# Patient Record
Sex: FEMALE | Race: WHITE | Employment: FULL TIME | URBAN - METROPOLITAN AREA
[De-identification: names, ages, dates, MRNs, and addresses within clinical notes are randomized per-mention and may not be internally consistent; named-entity substitution may affect disease eponyms.]

---

## 2018-08-08 ENCOUNTER — OFFICE VISIT (OUTPATIENT)
Dept: RHEUMATOLOGY | Age: 58
End: 2018-08-08

## 2018-08-08 VITALS
SYSTOLIC BLOOD PRESSURE: 148 MMHG | HEIGHT: 63 IN | DIASTOLIC BLOOD PRESSURE: 79 MMHG | TEMPERATURE: 98.5 F | WEIGHT: 182 LBS | HEART RATE: 89 BPM | BODY MASS INDEX: 32.25 KG/M2 | RESPIRATION RATE: 18 BRPM

## 2018-08-08 DIAGNOSIS — M79.672 BILATERAL FOOT PAIN: ICD-10-CM

## 2018-08-08 DIAGNOSIS — M79.671 BILATERAL FOOT PAIN: ICD-10-CM

## 2018-08-08 DIAGNOSIS — M19.042 PRIMARY OSTEOARTHRITIS OF BOTH HANDS: ICD-10-CM

## 2018-08-08 DIAGNOSIS — M19.031 LOCALIZED PRIMARY OSTEOARTHROSIS OF CARPOMETACARPAL JOINT OF RIGHT WRIST: ICD-10-CM

## 2018-08-08 DIAGNOSIS — M19.041 PRIMARY OSTEOARTHRITIS OF BOTH HANDS: ICD-10-CM

## 2018-08-08 DIAGNOSIS — M17.0 PRIMARY OSTEOARTHRITIS OF BOTH KNEES: Primary | ICD-10-CM

## 2018-08-08 RX ORDER — LORATADINE 10 MG/1
10 TABLET ORAL DAILY
COMMUNITY

## 2018-08-08 RX ORDER — OMEPRAZOLE 20 MG/1
20 CAPSULE, DELAYED RELEASE ORAL DAILY
COMMUNITY

## 2018-08-08 RX ORDER — SIMVASTATIN 20 MG/1
TABLET, FILM COATED ORAL
COMMUNITY

## 2018-08-08 RX ORDER — HYDROXYZINE HYDROCHLORIDE 10 MG/1
TABLET, FILM COATED ORAL
COMMUNITY

## 2018-08-08 RX ORDER — GLUCOSAMINE SULFATE 1500 MG
POWDER IN PACKET (EA) ORAL DAILY
COMMUNITY

## 2018-08-08 RX ORDER — BISOPROLOL FUMARATE 5 MG/1
5 TABLET ORAL DAILY
COMMUNITY

## 2018-08-08 RX ORDER — EZETIMIBE 10 MG/1
TABLET ORAL
COMMUNITY

## 2018-08-08 RX ORDER — ASPIRIN 81 MG/1
TABLET ORAL DAILY
COMMUNITY

## 2018-08-08 RX ORDER — ESCITALOPRAM OXALATE 10 MG/1
10 TABLET ORAL DAILY
COMMUNITY

## 2018-08-08 NOTE — PROGRESS NOTES
REASON FOR VISIT    This is the initial evaluation for Ms. Rogel Setting a 61 y.o.  female for question of an inflammatory arthritis. The patient is self-referred to the Arthritis and [address] [place] [address]. HISTORY OF PRESENT ILLNESS      I have reviewed and summarized old records from [facility]    In 2014, she developed pain in her thumbs, wrists, outer hips, feet. In 2/11/2014, she had a right 3rd digit trigger finger with good relief. In 5/04/2017, she developed right hand pain with mobility issues. She was given a wrist/thumb brace for 6 weeks with diclofenac 75 mg twice daily without improvement although the brace helped. In 6/15/2017, she received an injection into her right CMC thumb which helped. In 11/02/2017, she had an injection in her hand    In 1/10/2018, she had an injection in her right CMC thumb with relief. Today, she complains of progressive problems in her hands and feet described as aching pain and swelling. She has pain in her left IP pain associated with swelling. She has stiffness in her hands that does not improve. The pain is stabbing if she hits it. The pain is worse with use and improves with rest. She has stiffness and sore pain in her big toes lasting 30 minutes to hours. She had injections in the past without relief. Diclofenac, Aleve, Motrin, or Tramadol do not help. She has done physical therapy for her right thumb without relief but the brace helps her thumb. She has not done physical therapy for her outer hips. 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     Therapy History includes:    Current DMARD therapy includes: none  Prior DMARD therapy includes: none  The following DMARDs have been ineffective: none  The following DMARDs were stopped because of side effects: none    REVIEW OF SYSTEMS    A 15 point review of systems was performed and summarized below.  The questionnaire was reviewed with the patient and scanned into the patient's medical record.     General: denies recent weight gain, recent weight loss, fatigue, weakness, fever, night sweats  Musculoskeletal: endorses joint pain, joint swelling, morning stiffness (lasting 30 minutes to hours), denies muscle pain  Ears: denies ringing in ears, loss of hearing, deafness  Eyes: denies pain, redness, loss of vision, double vision, blurred vision, dryness, foreign body sensation  Mouth: denies sore tongue, oral ulcers, bleeding gums, loss of taste, dryness, increased dental caries  Nose: denies nosebleeds, loss of smell, nasal ulcers  Throat: denies frequent sore throats, hoarseness, difficulty in swallowing, pain in jaw while chewing  Neck: denies swollen glands, tender glands  Cardiopulmonary: denies pain in chest, irregular heart beat, sudden changes in heart beat, shortness of breath, difficulty breathing at night, dry cough, productive cough, coughing of blood, wheezing  Gastrointestinal: denies nausea, heartburn, stomach pain relieved by food, vomiting of blood/\"coffee grounds\", jaundice, increasing constipation, persistent diarrhea, blood in stools, black stools  Genitourinary: denies nocturia, difficult urination, pain or burning on urination, blood in urine, cloudy urine, pus in urine, genital discharge, frequent urination, vaginal dryness, rash/ulcers, sexual difficulties  Hematologic: denies anemia, bleeding tendency, blood clots  Skin: denies easy bruising, sun sensitive, rash, redness, hives, skin tightness, nodules/bumps, hair loss, color changes of hands or feet in the cold (Raynaud's)  Neurologic: denies headaches, dizziness, muscle weakness, numbness or tingling in hands/feet, memory loss  Psychiatric: denies depression, excessive worries, PTSD, Bipolar  Sleep: endorses obstructive sleep apnea, denies poor sleep (7-9 hours), denies snoring, apnea, daytime somnolence, difficulty falling asleep, difficulty staying asleep     PAST MEDICAL HISTORY    She has a past medical history of Hyperlipidemia; Hypertension; and Sleep apnea. FAMILY HISTORY    Her family history includes Arthritis-osteo in her father and mother; Atrial Fibrillation in her father and mother; Cancer in her father; Diabetes in her father; Heart Attack in her father; Heart Disease in her father; High Cholesterol in her father and mother; Hypertension in her father, mother, sister, and sister. SOCIAL HISTORY    She reports that she has never smoked. She has never used smokeless tobacco. She reports that she drinks alcohol. She reports that she does not use illicit drugs. GYNECOLOGIC HISTORY     0, Para 0, Living 0, Miscarriage 0    She denies severe pre-eclampsia, eclampsia or placental insufficiency    HEALTH MAINTENANCE    Immunizations    There is no immunization history on file for this patient. Age Appropriate Cancer Screening    Colonoscopy:   PAP Smear: 2018  Mammogram: 2018    MEDICATIONS    Current Outpatient Prescriptions   Medication Sig Dispense Refill    bisoprolol (ZEBETA) 5 mg tablet Take 5 mg by mouth daily.  ezetimibe (ZETIA) 10 mg tablet Take  by mouth.  simvastatin (ZOCOR) 20 mg tablet Take  by mouth nightly.  escitalopram oxalate (LEXAPRO) 10 mg tablet Take 10 mg by mouth daily.  hydrOXYzine HCl (ATARAX) 10 mg tablet Take  by mouth three (3) times daily as needed for Itching.  loratadine (CLARITIN) 10 mg tablet Take 10 mg by mouth daily.  omeprazole (PRILOSEC) 20 mg capsule Take 20 mg by mouth daily.  aspirin delayed-release 81 mg tablet Take  by mouth daily.  cholecalciferol (VITAMIN D3) 1,000 unit cap Take  by mouth daily. ALLERGIES    No Known Allergies    PHYSICAL EXAMINATION    Visit Vitals    /79    Pulse 89    Temp 98.5 °F (36.9 °C)    Resp 18    Ht 5' 3\" (1.6 m)    Wt 182 lb (82.6 kg)    BMI 32.24 kg/m2     Body mass index is 32.24 kg/(m^2).     General: Patient is alert, oriented x 3, not in acute distress    HEENT:   Conjunctiva are not injected and appear moist, there is no alopecia, neck is supple  Salivary glands are normal    Cardiovascular:  Heart is regular rate and rhythm, no murmurs. Chest:  Lungs are clear to auscultation bilaterally. Extremities:  Free of clubbing, cyanosis, edema, extremities well perfused. Neurological exam:  No focal sensory deficits, muscle strength is full in upper and lower extremities. Skin exam:  There are no rashes, no tophi, no psoriasis, no active Raynaud's, no livedo reticularis, no periungual erythema. Musculoskeletal exam:  A comprehensive musculoskeletal exam was performed for all joints of each upper and lower extremity and assessed for swelling, tenderness and range of motion. Pertinent results are documented as below:    Right CMC tenderness  Right 3rd A1 pulley crepitus with tenderness with bony hypertrophy of the right 3rd MCP  Bilateral Jeff and [place] nodes. Bilateral knee crepitus without effusion. Bilateral 1st MTP tenderness    Z-Deformities:   no  [place] Neck Deformities:  no  Boutonierre's Deformities:  no  Ulnar Deviation:   no  MCP Subluxation:  no    Joint Count 8/8/2018   Patient pain (0-100) 75   MHAQ 0.125   Left thumb IP - Tender 1   Left thumb IP - Swollen 1   Left 4th PIP - Tender 1   Right 2nd PIP - Tender 1   Right 2nd PIP - Swollen 1   Tender Joint Count (Total) 3   Swollen Joint Count (Total) 2   Patient Assessment (0-10) 2     DATA REVIEW    Prior medical records were reviewed and are summarized as below:    Laboratory data: summarized in the HPI    Imaging: summarized in the HPI. ASSESSMENT AND PLAN    1) Bilateral Hand and CMC Osteoarthritis. The patient has osteoarthritis, which is also known as \"wear and tear arthritis,\" non-inflammatory arthritis or mechanical arthritis. There are hereditary, vocational and posttraumatic joint injuries predisposing factors.  I recommend non-pharmacologic modalities such as keeping hands warm, avoid activities that case pain, warm water soaks, in addition to (2) pharmacologic modalities such as acetaminophen as first line, oral and topical NSAIDs as second line. Naproxen is a low MIRANDA-2 selectivity inhibitor that poses lower cardiovascular risk than other NSAIDs (Tulio Monson. Clinical Pharmacology and Cardiovascular Safety of Naproxen. Am J Cardiovasc Drugs. 2016 Nov 8). NSAIDs should not be used in patients on blood thinners, chronic kidney disease, high risk coronary artery disease, and inflammatory bowel disease (ulcerative colitis or Crohn's disease) I recommended ball squeezing and holding until hand fatigue then alternating to other hand exercises 10 times twice daily. I ordered screening labs and radiographs for Rheumatoid Arthritis but I have a low index of suspiciouns based on her history and exam. If abnormal, I will have her follow up.    2) Bilateral Knee Osteoarthritis. The patient has osteoarthritis, which is also known as \"wear and tear arthritis,\" non-inflammatory arthritis or mechanical arthritis. There are hereditary, vocational and posttraumatic joint injuries predisposing factors. I recommend maintaining a healthy weight to slow the progression of osteoarthritis in addition to following the [employer] of Rheumatology Osteoarthritis Treatment Guidelines: (1) non-pharmacologic modalities such as aerobic, aquatic, and/or resistance exercises as well as weight loss for overweight patients; in addition to (2) pharmacologic modalities such as acetaminophen as first line, oral and topical NSAIDs as second line, tramadol as third line and intra-articular corticosteroid injections as fourth line (Tierney MC, et al. Arthritis Care Res [facility] ORTHOPEDIC). 2012;64(4):465). Naproxen is a low MIRANDA-2 selectivity inhibitor that poses lower cardiovascular risk than other NSAIDs (Tulio Monson. Clinical Pharmacology and Cardiovascular Safety of Naproxen.  Am J Cardiovasc Drugs. 2016 Nov 8). NSAIDs should not be used in patients on blood thinners, chronic kidney disease, high risk coronary artery disease, and inflammatory bowel disease (ulcerative colitis or Crohn's disease) Joint replacement surgery if all previous fail, knowing that there is a 10 year lifespan per prosthesis. I recommend weight loss because every 1 lb of extra weight is approximately 5 lbs of extra weight on the knees. I asked the patient to count their daily caloric intake and try not to exceed 7548-7843 calories. I asked the patient to perform at least 30 minutes of physical exercise per day, such as walking, climbing stairs, or swimming. If they have access to a pool, I recommend walking in the pool for at least 30 minutes every day. Performing at least 8 weeks of yoga (two 60-minute classes and 1 home practice per week) was shown to help individuals with arthritis safely increase physical activity, and improve physical and psychological health [facility] et al. Jace Hyatt Rheumatol. 2015 Jul;42(7):1194-202). She follows with orthopedic with injections. 3) Bilateral 1st MTP Pain. This appears to be degenerative versus inflammation and not crystal. It improves with cortisone injections. I ordered radiographs. The patient voiced understanding of the aforementioned assessment and plan. Summary of plan was provided in the After Visit Summary patient instructions. I also provided education about MyChart setup and utility.     TODAY'S ORDERS    Orders Placed This Encounter    XR FOOT LT MIN 3 V    XR FOOT RT MIN 3 V    XR HAND LT MIN 3 V    XR HAND RT MIN 3 V    CYCLIC CITRUL PEPTIDE AB, IGG    RHEUMATOID FACTOR, QL     Future Appointments  Date Time Provider Leelee Springer   8/8/2018 12:00 PM PAFP XRAY PAFP NORM SCHED     Alva Bar MD, [address] [facility]    Adult Rheumatology   Musculoskeletal Ultrasound Certified  [facility]  [address] [place] [address]   Phone [phone]  Fax [phone]

## 2018-08-08 NOTE — MR AVS SNAPSHOT
[address] [place] [address] 
[phone] Patient: Adelita Mcintyre MRN: [id number] :1960 Visit Information Date & Time Provider Department Dept. Phone Encounter #  
 2018 11:00 AM Nitin Samules, [address] [facility] [id number] Upcoming Health Maintenance Date Due Hepatitis C Screening 1960 DTaP/Tdap/Td series (1 - Tdap) 1980 PAP AKA CERVICAL CYTOLOGY 1980 FOBT Q 1 YEAR AGE 50-75 2009 BREAST CANCER SCRN MAMMOGRAM 2016 Influenza Age 5 to Adult 2018 Allergies as of 2018  Never Reviewed Not on File Current Immunizations  Never Reviewed No immunizations on file. Not reviewed this visit You Were Diagnosed With   
  
 Codes Comments Primary osteoarthritis of both knees    -  Primary ICD-10-CM: M17.0 ICD-9-CM: 715.16 Primary osteoarthritis of both hands     ICD-10-CM: M19.041, S36.979 ICD-9-CM: 715.14 Vitals BP Pulse Temp Resp Height(growth percentile) Weight(growth percentile) 148/79 89 98.5 °F (36.9 °C) 18 5' 3\" (1.6 m) 182 lb (82.6 kg) BMI OB Status Smoking Status 32.24 kg/m2 Hysterectomy Never Smoker BMI and BSA Data Body Mass Index Body Surface Area  
 32.24 kg/m 2 1.92 m 2 Preferred Pharmacy Pharmacy Name Phone [id number] [place], [address] [place]oaib Gao [phone] Your Updated Medication List  
  
   
This list is accurate as of 18 11:42 AM.  Always use your most recent med list.  
  
  
  
  
 aspirin delayed-release 81 mg tablet Take  by mouth daily. bisoprolol 5 mg tablet Commonly known as:  Raymondo Kales Take 5 mg by mouth daily. CLARITIN 10 mg tablet Generic drug:  loratadine Take 10 mg by mouth daily. hydrOXYzine HCl 10 mg tablet Commonly known as:  ATARAX Take  by mouth three (3) times daily as needed for Itching. LEXAPRO 10 mg tablet Generic drug:  escitalopram oxalate Take 10 mg by mouth daily. omeprazole 20 mg capsule Commonly known as:  PRILOSEC Take 20 mg by mouth daily. simvastatin 20 mg tablet Commonly known as:  ZOCOR Take  by mouth nightly. VITAMIN D3 1,000 unit Cap Generic drug:  cholecalciferol Take  by mouth daily. ZETIA 10 mg tablet Generic drug:  ezetimibe Take  by mouth. We Performed the Following [address], IGG [id number] CPT(R)] RHEUMATOID FACTOR, QL [id number] CPT(R)] To-Do List   
 08/08/2018 Imaging:  XR FOOT LT MIN 3 V   
  
 08/08/2018 Imaging:  XR FOOT RT MIN 3 V   
  
 08/08/2018 Imaging:  XR HAND LT MIN 3 V   
  
 08/08/2018 Imaging:  XR HAND RT MIN 3 V [facility]! [facility] introduces [employer] patient portal. Now you can access parts of your medical record, email your doctor's office, and request medication refills online. 1. In your internet browser, go to [email or web address] 2. Click on the First Time User? Click Here link in the Sign In box. You will see the New Member Sign Up page. 3. Enter your [employer] Access Code exactly as it appears below. You will not need to use this code after youve completed the sign-up process. If you do not sign up before the expiration date, you must request a new code. · Image[employer] Access Code: [email or web address] Expires: 11/6/2018 11:42 AM 
 
4. Enter the last four digits of your Social Security Number (xxxx) and Date of Birth (mm/dd/yyyy) as indicated and click Submit. You will be taken to the next sign-up page. 5. Create a [employer] ID. This will be your [employer] login ID and cannot be changed, so think of one that is secure and easy to remember. 6. Create a [employer] password. You can change your password at any time. 7. Enter your Password Reset Question and Answer. This can be used at a later time if you forget your password. 8. Enter your e-mail address. You will receive e-mail notification when new information is available in [address] 9. Click Sign Up. You can now view and download portions of your medical record. 10. Click the Download Summary menu link to download a portable copy of your medical information. If you have questions, please visit the Frequently Asked Questions section of the [employer] website. Remember, [employer] is NOT to be used for urgent needs. For medical emergencies, dial 911. Now available from your iPhone and Android! Please provide this summary of care documentation to your next provider. Your primary care clinician is listed as Delray Frankel. If you have any questions after today's visit, please call [phone].

## 2018-08-09 LAB
CCP IGA+IGG SERPL IA-ACNC: 2 UNITS (ref 0–19)
RHEUMATOID FACT SERPL-ACNC: <10 IU/ML (ref 0–13.9)

## 2018-08-10 ENCOUNTER — PATIENT MESSAGE (OUTPATIENT)
Dept: RHEUMATOLOGY | Age: 58
End: 2018-08-10

## 2018-08-28 NOTE — TELEPHONE ENCOUNTER
How Severe Is Your Cyst?: mild Patient notified xray results and lab work is needed per Dr. Edgardo Spears. Pt verbalized understanding . States she is out of town and will be in for labs when she returns. Is This A New Presentation, Or A Follow-Up?: Cyst

## 2018-09-05 DIAGNOSIS — M19.042 PRIMARY OSTEOARTHRITIS OF BOTH HANDS: Primary | ICD-10-CM

## 2018-09-05 DIAGNOSIS — M89.9 DISORDER OF BONE: ICD-10-CM

## 2018-09-05 DIAGNOSIS — M19.041 PRIMARY OSTEOARTHRITIS OF BOTH HANDS: Primary | ICD-10-CM

## 2018-09-10 LAB
ALBUMIN SERPL-MCNC: 4.4 G/DL (ref 3.5–5.5)
ALBUMIN/GLOB SERPL: 2.3 {RATIO} (ref 1.2–2.2)
ALP BONE SERPL-MCNC: 17.7 UG/L
ALP SERPL-CCNC: 91 IU/L (ref 39–117)
ALT SERPL-CCNC: 38 IU/L (ref 0–32)
AST SERPL-CCNC: 22 IU/L (ref 0–40)
BILIRUB SERPL-MCNC: <0.2 MG/DL (ref 0–1.2)
BUN SERPL-MCNC: 15 MG/DL (ref 6–24)
BUN/CREAT SERPL: 21 (ref 9–23)
CALCIUM SERPL-MCNC: 9.5 MG/DL (ref 8.7–10.2)
CHLORIDE SERPL-SCNC: 103 MMOL/L (ref 96–106)
CO2 SERPL-SCNC: 25 MMOL/L (ref 20–29)
CREAT SERPL-MCNC: 0.71 MG/DL (ref 0.57–1)
FERRITIN SERPL-MCNC: 102 NG/ML (ref 15–150)
GLOBULIN SER CALC-MCNC: 1.9 G/DL (ref 1.5–4.5)
GLUCOSE SERPL-MCNC: 115 MG/DL (ref 65–99)
IRON SATN MFR SERPL: 16 % (ref 15–55)
IRON SERPL-MCNC: 58 UG/DL (ref 27–159)
PHOSPHATE SERPL-MCNC: 4.1 MG/DL (ref 2.5–4.5)
POTASSIUM SERPL-SCNC: 4.9 MMOL/L (ref 3.5–5.2)
PROT SERPL-MCNC: 6.3 G/DL (ref 6–8.5)
PTH-INTACT SERPL-MCNC: 25 PG/ML (ref 15–65)
SODIUM SERPL-SCNC: 144 MMOL/L (ref 134–144)
TIBC SERPL-MCNC: 353 UG/DL (ref 250–450)
TSH SERPL DL<=0.005 MIU/L-ACNC: 1.74 UIU/ML (ref 0.45–4.5)
UIBC SERPL-MCNC: 295 UG/DL (ref 131–425)